# Patient Record
Sex: FEMALE | Race: WHITE | Employment: UNEMPLOYED | URBAN - METROPOLITAN AREA
[De-identification: names, ages, dates, MRNs, and addresses within clinical notes are randomized per-mention and may not be internally consistent; named-entity substitution may affect disease eponyms.]

---

## 2017-06-22 ENCOUNTER — TELEPHONE (OUTPATIENT)
Dept: INTERNAL MEDICINE CLINIC | Age: 57
End: 2017-06-22

## 2017-06-22 DIAGNOSIS — F40.243 FEAR OF FLYING: Primary | ICD-10-CM

## 2017-06-22 NOTE — TELEPHONE ENCOUNTER
Patient is leaving next Weds, the 28th on her trip.   Asked if Dr. Emmer Canavan would send in a rx for Lorazepam 0.5mg, #30 to [facility]

## 2017-06-23 ENCOUNTER — TELEPHONE (OUTPATIENT)
Dept: INTERNAL MEDICINE CLINIC | Age: 57
End: 2017-06-23

## 2017-06-23 RX ORDER — ALPRAZOLAM 0.5 MG/1
TABLET ORAL
Qty: 10 TAB | Refills: 0 | OUTPATIENT
Start: 2017-06-23 | End: 2017-10-24

## 2017-06-23 NOTE — TELEPHONE ENCOUNTER
Called pt and informed them that Lorazepam is not on pt medication list. Pt stated that she did discuss with Dr Selma Brito before that she had tried this medication before and that she is traveling to [facility] next week and would like to have it on hand if she needed it. She stated why she is asking for 30 pills is because of cost because to get 2 would be just as much as to get 30.

## 2017-06-23 NOTE — TELEPHONE ENCOUNTER
Is this is the right patient? She is not on lorazepam.  I'm not sure about what trip she is talking about. I'm not sure why she needs 30 days of ativan.

## 2017-06-25 RX ORDER — ESTRADIOL 10 UG/1
10 INSERT VAGINAL
Qty: 24 TAB | Refills: 1 | Status: SHIPPED | OUTPATIENT
Start: 2017-06-26 | End: 2017-12-02 | Stop reason: SDUPTHER

## 2017-09-21 ENCOUNTER — HOSPITAL ENCOUNTER (OUTPATIENT)
Dept: MAMMOGRAPHY | Age: 57
Discharge: HOME | End: 2017-09-21
Payer: COMMERCIAL

## 2017-09-21 DIAGNOSIS — Z12.31 VISIT FOR SCREENING MAMMOGRAM: ICD-10-CM

## 2017-09-21 PROCEDURE — 77067 SCR MAMMO BI INCL CAD: CPT

## 2017-10-24 ENCOUNTER — OFFICE VISIT (OUTPATIENT)
Dept: INTERNAL MEDICINE CLINIC | Age: 57
End: 2017-10-24

## 2017-10-24 VITALS
SYSTOLIC BLOOD PRESSURE: 124 MMHG | WEIGHT: 141 LBS | DIASTOLIC BLOOD PRESSURE: 76 MMHG | HEART RATE: 88 BPM | RESPIRATION RATE: 16 BRPM | BODY MASS INDEX: 23.49 KG/M2 | TEMPERATURE: 98.9 F | OXYGEN SATURATION: 99 % | HEIGHT: 65 IN

## 2017-10-24 DIAGNOSIS — J40 BRONCHITIS: Primary | ICD-10-CM

## 2017-10-24 DIAGNOSIS — Z00.00 ROUTINE PHYSICAL EXAMINATION: Primary | ICD-10-CM

## 2017-10-24 RX ORDER — CODEINE PHOSPHATE AND GUAIFENESIN 10; 100 MG/5ML; MG/5ML
5 SOLUTION ORAL
Qty: 118 ML | Refills: 0 | Status: SHIPPED | OUTPATIENT
Start: 2017-10-24 | End: 2017-10-30

## 2017-10-24 RX ORDER — ESTRADIOL 10 UG/1
10 INSERT VAGINAL
Qty: 24 TAB | Refills: 1 | Status: CANCELLED | OUTPATIENT
Start: 2017-10-26

## 2017-10-24 RX ORDER — DOXYCYCLINE 100 MG/1
100 CAPSULE ORAL 2 TIMES DAILY
Qty: 14 CAP | Refills: 0 | Status: SHIPPED | OUTPATIENT
Start: 2017-10-24 | End: 2017-10-31

## 2017-10-24 NOTE — PATIENT INSTRUCTIONS
Bronchitis: Care Instructions  Your Care Instructions    Bronchitis is inflammation of the bronchial tubes, which carry air to the lungs. The tubes swell and produce mucus, or phlegm. The mucus and inflamed bronchial tubes make you cough. You may have trouble breathing. Most cases of bronchitis are caused by viruses like those that cause colds. Antibiotics usually do not help and they may be harmful. Bronchitis usually develops rapidly and lasts about 2 to 3 weeks in otherwise healthy people. Follow-up care is a key part of your treatment and safety. Be sure to make and go to all appointments, and call your doctor if you are having problems. It's also a good idea to know your test results and keep a list of the medicines you take. How can you care for yourself at home? · Take all medicines exactly as prescribed. Call your doctor if you think you are having a problem with your medicine. · Get some extra rest.  · Take an over-the-counter pain medicine, such as acetaminophen (Tylenol), ibuprofen (Advil, Motrin), or naproxen (Aleve) to reduce fever and relieve body aches. Read and follow all instructions on the label. · Do not take two or more pain medicines at the same time unless the doctor told you to. Many pain medicines have acetaminophen, which is Tylenol. Too much acetaminophen (Tylenol) can be harmful. · Take an over-the-counter cough medicine that contains dextromethorphan to help quiet a dry, hacking cough so that you can sleep. Avoid cough medicines that have more than one active ingredient. Read and follow all instructions on the label. · Breathe moist air from a humidifier, hot shower, or sink filled with hot water. The heat and moisture will thin mucus so you can cough it out. · Do not smoke. Smoking can make bronchitis worse. If you need help quitting, talk to your doctor about stop-smoking programs and medicines. These can increase your chances of quitting for good.   When should you call for help? Call 911 anytime you think you may need emergency care. For example, call if:  · You have severe trouble breathing. Call your doctor now or seek immediate medical care if:  · You have new or worse trouble breathing. · You cough up dark brown or bloody mucus (sputum). · You have a new or higher fever. · You have a new rash. Watch closely for changes in your health, and be sure to contact your doctor if:  · You cough more deeply or more often, especially if you notice more mucus or a change in the color of your mucus. · You are not getting better as expected. Where can you learn more? Go to http://adria-arline.info/. Enter H333 in the search box to learn more about \"Bronchitis: Care Instructions. \"  Current as of: March 25, 2017  Content Version: 11.3  © 6957-5114 [employer]. Care instructions adapted under license by [employer] (which disclaims liability or warranty for this information). If you have questions about a medical condition or this instruction, always ask your healthcare professional. [address] any warranty or liability for your use of this information.

## 2017-10-24 NOTE — PROGRESS NOTES
Theo Cuevas is a 61 y.o. female who was seen in clinic today (10/24/2017). Assessment & Plan:  Diagnoses and all orders for this visit:    1. Bronchitis- discussed diagnosis & treatment options, favor this is bacterial at this time. Did review it could still be viral. Will start meds below, red flags were reviewed with the patient to RTC or notify me. Expected time course for resolution reviewed with patient.   -     guaiFENesin-codeine (ROBITUSSIN AC) 100-10 mg/5 mL solution; Take 5 mL by mouth three (3) times daily as needed for Cough. Max Daily Amount: 15 mL. -     doxycycline (VIBRAMYCIN) 100 mg capsule; Take 1 Cap by mouth two (2) times a day for 7 days. Follow-up Disposition:  Return if symptoms worsen or fail to improve.       ----------------------------------------------------------------------    Subjective:  Kym was seen today for Cough    URI Review  Kiana Woody returns to clinic today to talk about: URI symptoms for 7 days ago, which are fluctuating. She also reports sore throat, productive cough, right ear pain/fullness, hot and cold spells, chest pain (burning from throat to chest) and sinus congestion. Treatments have included: Robitussin & OTC cough meds which have been not very effective. Relevant PMH: No pertinent additional PMH. Patient reports sick contacts: yes - has been around someone w/ bronchitis. Prior to Admission medications    Medication Sig Start Date End Date Taking? Authorizing Provider   estradiol (VAGIFEM) 10 mcg tab vaginal tablet Insert 1 Tab into vagina every Monday and Thursday. 6/26/17  Yes Julianne Melgar MD   cholecalciferol, vitamin D3, (VITAMIN D3) 2,000 unit tab Take 1 Tab by mouth daily. Yes Historical Provider   INULIN (FIBER GUMMIES PO) Take 2 Each by mouth nightly. Yes Historical Provider   melatonin 5 mg tab Take 5 mg by mouth nightly as needed.     Historical Provider          Allergies   Allergen Reactions  Adhesive Tape-Silicones Other (comments)     bruising    Floxin [Ofloxacin] Other (comments)     Altered mental status.  Red Dye Nausea Only     Shaking, severe headache.  Sulfa (Sulfonamide Antibiotics) Other (comments)     Many relatives are allergic to sulfa - causes facial swelling, hives and lumps all over body (systemic reaction). ROS - per HPI       Objective:   Physical Exam   Constitutional: No distress. HENT:   Right Ear: Tympanic membrane is injected. Tympanic membrane is not erythematous and not bulging. A middle ear effusion (moderate) is present. Left Ear: Tympanic membrane is not erythematous and not bulging. No middle ear effusion. Nose: No mucosal edema or rhinorrhea. Right sinus exhibits no maxillary sinus tenderness and no frontal sinus tenderness. Left sinus exhibits no maxillary sinus tenderness and no frontal sinus tenderness. Mouth/Throat: Uvula is midline and mucous membranes are normal. Posterior oropharyngeal erythema present. No oropharyngeal exudate. Eyes: Conjunctivae are normal. No scleral icterus. Neck: Neck supple. Cardiovascular: Regular rhythm and normal heart sounds. No murmur heard. Pulmonary/Chest: Effort normal and breath sounds normal. She has no wheezes. She has no rales. Lymphadenopathy:     She has no cervical adenopathy. Visit Vitals    /76    Pulse 88    Temp 98.9 °F (37.2 °C) (Oral)    Resp 16    Ht 5' 4.5\" (1.638 m)    Wt 141 lb (64 kg)    SpO2 99%    BMI 23.83 kg/m2         Disclaimer:  Advised her to call back or return to office if symptoms worsen/change/persist.  Discussed expected course/resolution/complications of diagnosis in detail with patient. Medication risks/benefits/costs/interactions/alternatives discussed with patient. She was given an after visit summary which includes diagnoses, current medications, & vitals. She expressed understanding with the diagnosis and plan.         Amberly Sommer Jeet Ballard MD

## 2017-10-24 NOTE — MR AVS SNAPSHOT
Visit Information Date & Time Provider Department Dept. Phone Encounter #  
 10/24/2017  4:00 PM Sandra May, [address] [facility] Internal Medicine [phone] [id number] Follow-up Instructions Return if symptoms worsen or fail to improve. Your Appointments 10/30/2017  8:30 AM  
WELL WOMAN EXAM with Sandra May MD  
[facility] Internal Medicine [facility]) Appt Note: [address] [facility] [address] [facility] [address]  
[address] [address] [facility] Upcoming Health Maintenance Date Due Pneumococcal 19-64 Highest Risk (1 of 3 - PCV13) 8/19/1978 COLONOSCOPY 3/27/2018 PAP AKA CERVICAL CYTOLOGY 9/29/2018 BREAST CANCER SCRN MAMMOGRAM 9/21/2019 DTaP/Tdap/Td series (2 - Td) 9/29/2025 Allergies as of 10/24/2017  Review Complete On: 10/24/2017 By: Sandra May MD  
  
 Severity Noted Reaction Type Reactions Adhesive Tape-silicones  86/00/9330    Other (comments) bruising Floxin [Ofloxacin]  02/03/2012    Other (comments) Altered mental status. Red Dye  02/03/2012    Nausea Only Shaking, severe headache. Sulfa (Sulfonamide Antibiotics)  12/26/2012    Other (comments) Many relatives are allergic to sulfa - causes facial swelling, hives and lumps all over body (systemic reaction). Current Immunizations  Reviewed on 10/24/2017 Name Date Tdap 9/29/2015 Reviewed by Jeanne Galan RN on 10/24/2017 at  4:09 PM  
You Were Diagnosed With   
  
 Codes Comments Bronchitis    -  Primary ICD-10-CM: N57 ICD-9-CM: 802 Vitals BP Pulse Temp Resp Height(growth percentile) Weight(growth percentile) 124/76 88 98.9 °F (37.2 °C) (Oral) 16 5' 4.5\" (1.638 m) 141 lb (64 kg) SpO2 BMI OB Status Smoking Status 99% 23.83 kg/m2 Postmenopausal Never Smoker Vitals History BMI and BSA Data Body Mass Index Body Surface Area 23.83 kg/m 2 1.71 m 2 Preferred Pharmacy Pharmacy Name Phone [facility][id number]- Makenna Mcdaniels, [address] [address] [phone] Your Updated Medication List  
  
   
This list is accurate as of: 10/24/17  4:35 PM.  Always use your most recent med list.  
  
  
  
  
 doxycycline 100 mg capsule Commonly known as:  VIBRAMYCIN Take 1 Cap by mouth two (2) times a day for 7 days. estradiol 10 mcg Tab vaginal tablet Commonly known as:  Donna Marcial Insert 1 Tab into vagina every Monday and Thursday. FIBER GUMMIES PO Take 2 Each by mouth nightly. guaiFENesin-codeine 100-10 mg/5 mL solution Commonly known as:  ROBITUSSIN AC Take 5 mL by mouth three (3) times daily as needed for Cough. Max Daily Amount: 15 mL.  
  
 melatonin Tab tablet Take 5 mg by mouth nightly as needed. VITAMIN D3 2,000 unit Tab Generic drug:  cholecalciferol (vitamin D3) Take 1 Tab by mouth daily. Prescriptions Printed Refills  
 guaiFENesin-codeine (ROBITUSSIN AC) 100-10 mg/5 mL solution 0 Sig: Take 5 mL by mouth three (3) times daily as needed for Cough. Max Daily Amount: 15 mL. Class: Print Route: Oral  
  
Prescriptions Sent to Pharmacy Refills  
 doxycycline (VIBRAMYCIN) 100 mg capsule 0 Sig: Take 1 Cap by mouth two (2) times a day for 7 days. Class: Normal  
 Pharmacy: [facility]/pharmacy #[id number] Mcdaniels, [address] #: [phone] Route: Oral  
  
Follow-up Instructions Return if symptoms worsen or fail to improve. Patient Instructions Bronchitis: Care Instructions Your Care Instructions Bronchitis is inflammation of the bronchial tubes, which carry air to the lungs. The tubes swell and produce mucus, or phlegm. The mucus and inflamed bronchial tubes make you cough. You may have trouble breathing. Most cases of bronchitis are caused by viruses like those that cause colds. Antibiotics usually do not help and they may be harmful. Bronchitis usually develops rapidly and lasts about 2 to 3 weeks in otherwise healthy people. Follow-up care is a key part of your treatment and safety. Be sure to make and go to all appointments, and call your doctor if you are having problems. It's also a good idea to know your test results and keep a list of the medicines you take. How can you care for yourself at home? · Take all medicines exactly as prescribed. Call your doctor if you think you are having a problem with your medicine. · Get some extra rest. 
· Take an over-the-counter pain medicine, such as acetaminophen (Tylenol), ibuprofen (Advil, Motrin), or naproxen (Aleve) to reduce fever and relieve body aches. Read and follow all instructions on the label. · Do not take two or more pain medicines at the same time unless the doctor told you to. Many pain medicines have acetaminophen, which is Tylenol. Too much acetaminophen (Tylenol) can be harmful. · Take an over-the-counter cough medicine that contains dextromethorphan to help quiet a dry, hacking cough so that you can sleep. Avoid cough medicines that have more than one active ingredient. Read and follow all instructions on the label. · Breathe moist air from a humidifier, hot shower, or sink filled with hot water. The heat and moisture will thin mucus so you can cough it out. · Do not smoke. Smoking can make bronchitis worse. If you need help quitting, talk to your doctor about stop-smoking programs and medicines. These can increase your chances of quitting for good. When should you call for help? Call 911 anytime you think you may need emergency care. For example, call if: 
· You have severe trouble breathing. Call your doctor now or seek immediate medical care if: 
· You have new or worse trouble breathing. · You cough up dark brown or bloody mucus (sputum). · You have a new or higher fever. · You have a new rash. Watch closely for changes in your health, and be sure to contact your doctor if: 
· You cough more deeply or more often, especially if you notice more mucus or a change in the color of your mucus. · You are not getting better as expected. Where can you learn more? Go to http://adria-arline.info/. Enter H333 in the search box to learn more about \"Bronchitis: Care Instructions. \" Current as of: March 25, 2017 Content Version: 11.3 © 4129-4124 [employer]. Care instructions adapted under license by [employer] (which disclaims liability or warranty for this information). If you have questions about a medical condition or this instruction, always ask your healthcare professional. [address] any warranty or liability for your use of this information. Introducing [facility]! Ni Harvey introduces SceneDoc patient portal. Now you can access parts of your medical record, email your doctor's office, and request medication refills online. 1. In your internet browser, go to [email or web address] 2. Click on the First Time User? Click Here link in the Sign In box. You will see the New Member Sign Up page. 3. Enter your SceneDoc Access Code exactly as it appears below. You will not need to use this code after youve completed the sign-up process. If you do not sign up before the expiration date, you must request a new code. · SceneDoc Access Code: [email or web address] Expires: 12/17/2017 11:37 AM 
 
4. Enter the last four digits of your Social Security Number (xxxx) and Date of Birth (mm/dd/yyyy) as indicated and click Submit. You will be taken to the next sign-up page. 5. Create a SceneDoc ID. This will be your SceneDoc login ID and cannot be changed, so think of one that is secure and easy to remember. 6. Create a [employer] password. You can change your password at any time. 7. Enter your Password Reset Question and Answer. This can be used at a later time if you forget your password. 8. Enter your e-mail address. You will receive e-mail notification when new information is available in [address] 9. Click Sign Up. You can now view and download portions of your medical record. 10. Click the Download Summary menu link to download a portable copy of your medical information. If you have questions, please visit the Frequently Asked Questions section of the [employer] website. Remember, [employer] is NOT to be used for urgent needs. For medical emergencies, dial 911. Now available from your iPhone and Android! Please provide this summary of care documentation to your next provider. Your primary care clinician is listed as Joanie Cuevas. If you have any questions after today's visit, please call [phone].

## 2017-10-27 LAB
ALBUMIN SERPL-MCNC: 4.8 G/DL (ref 3.5–5.5)
ALBUMIN/GLOB SERPL: 1.7 {RATIO} (ref 1.2–2.2)
ALP SERPL-CCNC: 91 IU/L (ref 39–117)
ALT SERPL-CCNC: 12 IU/L (ref 0–32)
AST SERPL-CCNC: 13 IU/L (ref 0–40)
BILIRUB SERPL-MCNC: 0.5 MG/DL (ref 0–1.2)
BUN SERPL-MCNC: 13 MG/DL (ref 6–24)
BUN/CREAT SERPL: 16 (ref 9–23)
CALCIUM SERPL-MCNC: 9.7 MG/DL (ref 8.7–10.2)
CEA SERPL-MCNC: 1.5 NG/ML (ref 0–4.7)
CHLORIDE SERPL-SCNC: 100 MMOL/L (ref 96–106)
CHOLEST SERPL-MCNC: 277 MG/DL (ref 100–199)
CO2 SERPL-SCNC: 21 MMOL/L (ref 18–29)
CREAT SERPL-MCNC: 0.79 MG/DL (ref 0.57–1)
ERYTHROCYTE [DISTWIDTH] IN BLOOD BY AUTOMATED COUNT: 13.9 % (ref 12.3–15.4)
GFR SERPLBLD CREATININE-BSD FMLA CKD-EPI: 83 ML/MIN/1.73
GFR SERPLBLD CREATININE-BSD FMLA CKD-EPI: 95 ML/MIN/1.73
GLOBULIN SER CALC-MCNC: 2.8 G/DL (ref 1.5–4.5)
GLUCOSE SERPL-MCNC: 97 MG/DL (ref 65–99)
HCT VFR BLD AUTO: 42.1 % (ref 34–46.6)
HDLC SERPL-MCNC: 100 MG/DL
HGB BLD-MCNC: 14.2 G/DL (ref 11.1–15.9)
LDLC SERPL CALC-MCNC: 163 MG/DL (ref 0–99)
MCH RBC QN AUTO: 29.6 PG (ref 26.6–33)
MCHC RBC AUTO-ENTMCNC: 33.7 G/DL (ref 31.5–35.7)
MCV RBC AUTO: 88 FL (ref 79–97)
PLATELET # BLD AUTO: 226 X10E3/UL (ref 150–379)
POTASSIUM SERPL-SCNC: 4.2 MMOL/L (ref 3.5–5.2)
PROT SERPL-MCNC: 7.6 G/DL (ref 6–8.5)
RBC # BLD AUTO: 4.79 X10E6/UL (ref 3.77–5.28)
SODIUM SERPL-SCNC: 141 MMOL/L (ref 134–144)
TRIGL SERPL-MCNC: 70 MG/DL (ref 0–149)
VLDLC SERPL CALC-MCNC: 14 MG/DL (ref 5–40)
WBC # BLD AUTO: 5.2 X10E3/UL (ref 3.4–10.8)

## 2017-10-27 NOTE — PROGRESS NOTES
She has appointment on 10/30 to review. All labs are stable or at goal.  10 yr CVD risk is 2.1%. CEA is stable.

## 2017-10-30 ENCOUNTER — OFFICE VISIT (OUTPATIENT)
Dept: INTERNAL MEDICINE CLINIC | Age: 57
End: 2017-10-30

## 2017-10-30 VITALS
HEART RATE: 65 BPM | DIASTOLIC BLOOD PRESSURE: 78 MMHG | TEMPERATURE: 98.2 F | SYSTOLIC BLOOD PRESSURE: 132 MMHG | HEIGHT: 65 IN | WEIGHT: 139 LBS | OXYGEN SATURATION: 99 % | RESPIRATION RATE: 12 BRPM | BODY MASS INDEX: 23.16 KG/M2

## 2017-10-30 DIAGNOSIS — N95.1 VAGINAL DRYNESS, MENOPAUSAL: ICD-10-CM

## 2017-10-30 DIAGNOSIS — Z00.00 ROUTINE PHYSICAL EXAMINATION: Primary | ICD-10-CM

## 2017-10-30 DIAGNOSIS — M85.89 OSTEOPENIA OF MULTIPLE SITES: ICD-10-CM

## 2017-10-30 DIAGNOSIS — C18.9 MALIGNANT NEOPLASM OF COLON, UNSPECIFIED PART OF COLON (HCC): ICD-10-CM

## 2017-10-30 DIAGNOSIS — F41.9 ANXIETY: ICD-10-CM

## 2017-10-30 DIAGNOSIS — L40.9 PSORIASIS: ICD-10-CM

## 2017-10-30 NOTE — PROGRESS NOTES
Ida Beckford is a 61 y.o. female who was seen in clinic today (10/30/2017) for a full physical.      Assessment & Plan:   Diagnoses and all orders for this visit:    1. Routine physical examination  -     Previous labs reviewed & discussed with her, lab letter provided today. 2. Malignant neoplasm of colon, unspecified part of colon ([facility] [address]- stable CEA, due for c-scope, she will contact Dr Sherryle Chamber to set up procedure    3. Anxiety- stable off meds, cont lifestyle changes and will notice me if anything changes. 4. Osteopenia of multiple sites- stable, continue current treatment, will plan on repeating DEXA in '20    5. Vaginal dryness, menopausal- improved with meds, continue at this time    6. Psoriasis- chronic issue, she will make appointment with specialist to review, looks well controlled now. Follow-up Disposition:  Return in about 1 year (around 10/30/2018) for FULL PHYSICAL - 30 minutes. ------------------------------------------------------------------------------------------    Subjective:   Ida Watson is here today for a full physical.      Health Maintenance  Immunizations:    Influenza: declined. Tetanus: up to date. Shingles: not up to date - will defer to next year and plan on getting Shingrix. Pneumonia: n/a. Cancer screening:    Cervical: reviewed guidelines, UTD. Breast: reviewed guidelines, reviewed SBE with her, UTD. Colon: she will f/u with colorectal surgeon. Patient Care Team:  Chong Rossi MD as PCP - General (Internal Medicine)  Fya Gonzalez MD (Dermatology)      The following sections were reviewed & updated as appropriate: PMH, PSH, FH, and SH. Patient Active Problem List   Diagnosis Code    Colon cancer ([facility] [address] C18.9    Anemia due to blood loss, chronic D50.0    Psoriasis L40.9    Multiple fractures T07. XXXA    Osteopenia M85.80    Anxiety F41.9    Vaginal dryness, menopausal N95.1    Closed nondisplaced fracture of phalanx of lesser toe of right foot S92.504A          Prior to Admission medications    Medication Sig Start Date End Date Taking? Authorizing Provider   doxycycline (VIBRAMYCIN) 100 mg capsule Take 1 Cap by mouth two (2) times a day for 7 days. 10/24/17 10/31/17 Yes Tomas Thomas MD   estradiol (VAGIFEM) 10 mcg tab vaginal tablet Insert 1 Tab into vagina every Monday and Thursday. 6/26/17  Yes Tomas Thomas MD   cholecalciferol, vitamin D3, (VITAMIN D3) 2,000 unit tab Take 1 Tab by mouth daily. Yes Historical Provider   INULIN (FIBER GUMMIES PO) Take 2 Each by mouth nightly. Yes Historical Provider          Allergies   Allergen Reactions    Adhesive Tape-Silicones Other (comments)     bruising    Floxin [Ofloxacin] Other (comments)     Altered mental status.  Red Dye Nausea Only     Shaking, severe headache.  Sulfa (Sulfonamide Antibiotics) Other (comments)     Many relatives are allergic to sulfa - causes facial swelling, hives and lumps all over body (systemic reaction). Review of Systems   Constitutional: Negative for chills and fever. Respiratory: Negative for cough and shortness of breath. Cardiovascular: Negative for chest pain and palpitations. Gastrointestinal: Negative for abdominal pain, blood in stool, constipation, diarrhea, heartburn, nausea and vomiting. Musculoskeletal: Negative for joint pain and myalgias. Skin: Positive for rash (she reports h/o psoriasis, on/off, seen derm on/off, slightly worse, has appt w/ Dr Juvenal Kawasaki in a month). Neurological: Negative for tingling, focal weakness and headaches. Endo/Heme/Allergies: Does not bruise/bleed easily. Psychiatric/Behavioral: Negative for depression. The patient is not nervous/anxious and does not have insomnia. Objective:   Physical Exam   Constitutional: She appears well-developed. No distress.    HENT:   Right Ear: Tympanic membrane, external ear and ear canal normal.   Left Ear: Tympanic membrane, external ear and ear canal normal.   Nose: Nose normal.   Mouth/Throat: Uvula is midline, oropharynx is clear and moist and mucous membranes are normal. No posterior oropharyngeal erythema. Eyes: Conjunctivae and lids are normal. No scleral icterus. Neck: Neck supple. Carotid bruit is not present. No thyromegaly present. Cardiovascular: Regular rhythm and normal heart sounds. No murmur heard. Pulses:       Dorsalis pedis pulses are 2+ on the right side, and 2+ on the left side. Posterior tibial pulses are 2+ on the right side, and 2+ on the left side. Pulmonary/Chest: Effort normal and breath sounds normal. She has no wheezes. She has no rales. Abdominal: Soft. Bowel sounds are normal. She exhibits no mass. There is no hepatosplenomegaly. There is no tenderness. Musculoskeletal: Normal range of motion. She exhibits no edema. Cervical back: Normal.        Thoracic back: She exhibits no bony tenderness. Lumbar back: Normal.   Lymphadenopathy:     She has no cervical adenopathy. Neurological: She has normal strength. No sensory deficit. Skin: No rash noted. Psychiatric: She has a normal mood and affect. Her behavior is normal.          Visit Vitals    /78    Pulse 65    Temp 98.2 °F (36.8 °C) (Oral)    Resp 12    Ht 5' 4.8\" (1.646 m)    Wt 139 lb (63 kg)    SpO2 99%    BMI 23.27 kg/m2          Advised her to call back or return to office if symptoms worsen/change/persist.  Discussed expected course/resolution/complications of diagnosis in detail with patient. Medication risks/benefits/costs/interactions/alternatives discussed with patient. She was given an after visit summary which includes diagnoses, current medications, & vitals. She expressed understanding with the diagnosis and plan.         Skipper Hammans, MD

## 2017-10-30 NOTE — PATIENT INSTRUCTIONS
Well Visit, Women 48 to 72: Care Instructions  Your Care Instructions    Physical exams can help you stay healthy. Your doctor has checked your overall health and may have suggested ways to take good care of yourself. He or she also may have recommended tests. At home, you can help prevent illness with healthy eating, regular exercise, and other steps. Follow-up care is a key part of your treatment and safety. Be sure to make and go to all appointments, and call your doctor if you are having problems. It's also a good idea to know your test results and keep a list of the medicines you take. How can you care for yourself at home? · Reach and stay at a healthy weight. This will lower your risk for many problems, such as obesity, diabetes, heart disease, and high blood pressure. · Get at least 30 minutes of exercise on most days of the week. Walking is a good choice. You also may want to do other activities, such as running, swimming, cycling, or playing tennis or team sports. · Do not smoke. Smoking can make health problems worse. If you need help quitting, talk to your doctor about stop-smoking programs and medicines. These can increase your chances of quitting for good. · Protect your skin from too much sun. When you're outdoors from 10 a.m. to 4 p.m., stay in the shade or cover up with clothing and a hat with a wide brim. Wear sunglasses that block UV rays. Even when it's cloudy, put broad-spectrum sunscreen (SPF 30 or higher) on any exposed skin. · See a dentist one or two times a year for checkups and to have your teeth cleaned. · Wear a seat belt in the car. · Limit alcohol to 1 drink a day. Too much alcohol can cause health problems. Follow your doctor's advice about when to have certain tests. These tests can spot problems early. · Cholesterol.  Your doctor will tell you how often to have this done based on your age, family history, or other things that can increase your risk for heart attack and stroke. · Blood pressure. Have your blood pressure checked during a routine doctor visit. Your doctor will tell you how often to check your blood pressure based on your age, your blood pressure results, and other factors. · Mammogram. Ask your doctor how often you should have a mammogram, which is an X-ray of your breasts. A mammogram can spot breast cancer before it can be felt and when it is easiest to treat. · Pap test and pelvic exam. Ask your doctor how often you should have a Pap test. You may not need to have a Pap test as often as you used to. · Vision. Have your eyes checked every year or two or as often as your doctor suggests. Some experts recommend that you have yearly exams for glaucoma and other age-related eye problems starting at age 48. · Hearing. Tell your doctor if you notice any change in your hearing. You can have tests to find out how well you hear. · Diabetes. Ask your doctor whether you should have tests for diabetes. · Colon cancer. You should begin tests for colon cancer at age 48. You may have one of several tests. Your doctor will tell you how often to have tests based on your age and risk. Risks include whether you already had a precancerous polyp removed from your colon or whether your parents, sisters and brothers, or children have had colon cancer. · Thyroid disease. Talk to your doctor about whether to have your thyroid checked as part of a regular physical exam. Women have an increased chance of a thyroid problem. · Osteoporosis. You should begin tests for bone density at age 72. If you are younger than 72, ask your doctor whether you have factors that may increase your risk for this disease. You may want to have this test before age 72. · Heart attack and stroke risk. At least every 4 to 6 years, you should have your risk for heart attack and stroke assessed.  Your doctor uses factors such as your age, blood pressure, cholesterol, and whether you smoke or have diabetes to show what your risk for a heart attack or stroke is over the next 10 years. When should you call for help? Watch closely for changes in your health, and be sure to contact your doctor if you have any problems or symptoms that concern you. Where can you learn more? Go to http://adria-arline.info/. Enter R893 in the search box to learn more about \"Well Visit, Women 50 to 72: Care Instructions. \"  Current as of: May 12, 2017  Content Version: 11.4  © 9072-8717 [employer]. Care instructions adapted under license by [employer] (which disclaims liability or warranty for this information). If you have questions about a medical condition or this instruction, always ask your healthcare professional. [address] any warranty or liability for your use of this information. Preventing Falls: Care Instructions  Your Care Instructions    Getting around your home safely can be a challenge if you have injuries or health problems that make it easy for you to fall. Loose rugs and furniture in walkways are among the dangers for many older people who have problems walking or who have poor eyesight. People who have conditions such as arthritis, osteoporosis, or dementia also have to be careful not to fall. You can make your home safer with a few simple measures. Follow-up care is a key part of your treatment and safety. Be sure to make and go to all appointments, and call your doctor if you are having problems. It's also a good idea to know your test results and keep a list of the medicines you take. How can you care for yourself at home? Taking care of yourself  · You may get dizzy if you do not drink enough water. To prevent dehydration, drink plenty of fluids, enough so that your urine is light yellow or clear like water. Choose water and other caffeine-free clear liquids.  If you have kidney, heart, or liver disease and have to limit fluids, talk with your doctor before you increase the amount of fluids you drink. · Exercise regularly to improve your strength, muscle tone, and balance. Walk if you can. Swimming may be a good choice if you cannot walk easily. · Have your vision and hearing checked each year or any time you notice a change. If you have trouble seeing and hearing, you might not be able to avoid objects and could lose your balance. · Know the side effects of the medicines you take. Ask your doctor or pharmacist whether the medicines you take can affect your balance. Sleeping pills or sedatives can affect your balance. · Limit the amount of alcohol you drink. Alcohol can impair your balance and other senses. · Ask your doctor whether calluses or corns on your feet need to be removed. If you wear loose-fitting shoes because of calluses or corns, you can lose your balance and fall. · Talk to your doctor if you have numbness in your feet. Preventing falls at home  · Remove raised doorway thresholds, throw rugs, and clutter. Repair loose carpet or raised areas in the floor. · Move furniture and electrical cords to keep them out of walking paths. · Use nonskid floor wax, and wipe up spills right away, especially on ceramic tile floors. · If you use a walker or cane, put rubber tips on it. If you use crutches, clean the bottoms of them regularly with an abrasive pad, such as steel wool. · Keep your house well lit, especially [address], and outside walkways. Use night-lights in areas such as hallways and bathrooms. Add extra light switches or use remote switches (such as switches that go on or off when you clap your hands) to make it easier to turn lights on if you have to get up during the night. · Install sturdy handrails on stairways. · Move items in your cabinets so that the things you use a lot are on the lower shelves (about waist level). · Keep a cordless phone and a flashlight with new batteries by your bed.  If possible, put a phone in each of the main rooms of your house, or carry a cell phone in case you fall and cannot reach a phone. Or, you can wear a device around your neck or wrist. You push a button that sends a signal for help. · Wear low-heeled shoes that fit well and give your feet good support. Use footwear with nonskid soles. Check the heels and soles of your shoes for wear. Repair or replace worn heels or soles. · Do not wear socks without shoes on wood floors. · Walk on the grass when the sidewalks are slippery. If you live in an area that gets snow and ice in the winter, sprinkle salt on slippery steps and sidewalks. Preventing falls in the bath  · Install grab bars and nonskid mats inside and outside your shower or tub and near the toilet and sinks. · Use shower chairs and bath benches. · Use a hand-held shower head that will allow you to sit while showering. · Get into a tub or shower by putting the weaker leg in first. Get out of a tub or shower with your strong side first.  · Repair loose toilet seats and consider installing a raised toilet seat to make getting on and off the toilet easier. · Keep your bathroom door unlocked while you are in the shower. Where can you learn more? Go to http://adria-arline.info/. Enter 0476 79 69 [id number] in the search box to learn more about \"Preventing Falls: Care Instructions. \"  Current as of: May 12, 2017  Content Version: 11.4  © 1017-9977 [employer]. Care instructions adapted under license by [employer] (which disclaims liability or warranty for this information). If you have questions about a medical condition or this instruction, always ask your healthcare professional. [address] any warranty or liability for your use of this information.

## 2017-10-30 NOTE — MR AVS SNAPSHOT
Visit Information Date & Time Provider Department Dept. Phone Encounter #  
 10/30/2017  8:30 AM Tal Charles, [address] [facility] Internal Medicine [phone] [id number] Follow-up Instructions Return in about 1 year (around 10/30/2018) for FULL PHYSICAL - 30 minutes. Upcoming Health Maintenance Date Due Pneumococcal 19-64 Highest Risk (1 of 3 - PCV13) 8/19/1978 COLONOSCOPY 3/27/2018 BREAST CANCER SCRN MAMMOGRAM 9/21/2019 PAP AKA CERVICAL CYTOLOGY 9/29/2020 DTaP/Tdap/Td series (2 - Td) 9/29/2025 Allergies as of 10/30/2017  Review Complete On: 10/30/2017 By: Jeanne Galan RN Severity Noted Reaction Type Reactions Adhesive Tape-silicones  73/48/0366    Other (comments) bruising Floxin [Ofloxacin]  02/03/2012    Other (comments) Altered mental status. Red Dye  02/03/2012    Nausea Only Shaking, severe headache. Sulfa (Sulfonamide Antibiotics)  12/26/2012    Other (comments) Many relatives are allergic to sulfa - causes facial swelling, hives and lumps all over body (systemic reaction). Current Immunizations  Reviewed on 10/30/2017 Name Date Tdap 9/29/2015 Reviewed by Jeanne Galan RN on 10/30/2017 at  8:39 AM  
You Were Diagnosed With   
  
 Codes Comments Routine physical examination    -  Primary ICD-10-CM: Z00.00 ICD-9-CM: V70.0 Malignant neoplasm of colon, unspecified part of colon ([facility] [address]     ICD-10-CM: C18.9 ICD-9-CM: 153.9 Anxiety     ICD-10-CM: F41.9 ICD-9-CM: 300.00 Osteopenia of multiple sites     ICD-10-CM: M85.89 ICD-9-CM: 733.90 Vaginal dryness, menopausal     ICD-10-CM: N95.1 ICD-9-CM: 627.2 Vitals BP Pulse Temp Resp Height(growth percentile) Weight(growth percentile) 132/78 65 98.2 °F (36.8 °C) (Oral) 12 5' 4.8\" (1.646 m) 139 lb (63 kg) SpO2 BMI OB Status Smoking Status 99% 23.27 kg/m2 Postmenopausal Never Smoker Vitals History BMI and BSA Data Body Mass Index Body Surface Area  
 23.27 kg/m 2 1.7 m 2 Preferred Pharmacy Pharmacy Name Phone [facility] [address] [address] Your Updated Medication List  
  
   
This list is accurate as of: 10/30/17  9:12 AM.  Always use your most recent med list.  
  
  
  
  
 doxycycline 100 mg capsule Commonly known as:  VIBRAMYCIN Take 1 Cap by mouth two (2) times a day for 7 days. estradiol 10 mcg Tab vaginal tablet Commonly known as:  Vincent Babb Insert 1 Tab into vagina every Monday and Thursday. FIBER GUMMIES PO Take 2 Each by mouth nightly. VITAMIN D3 2,000 unit Tab Generic drug:  cholecalciferol (vitamin D3) Take 1 Tab by mouth daily. Follow-up Instructions Return in about 1 year (around 10/30/2018) for FULL PHYSICAL - 30 minutes. Patient Instructions Well Visit, Women 48 to 72: Care Instructions Your Care Instructions Physical exams can help you stay healthy. Your doctor has checked your overall health and may have suggested ways to take good care of yourself. He or she also may have recommended tests. At home, you can help prevent illness with healthy eating, regular exercise, and other steps. Follow-up care is a key part of your treatment and safety. Be sure to make and go to all appointments, and call your doctor if you are having problems. It's also a good idea to know your test results and keep a list of the medicines you take. How can you care for yourself at home? · Reach and stay at a healthy weight. This will lower your risk for many problems, such as obesity, diabetes, heart disease, and high blood pressure. · Get at least 30 minutes of exercise on most days of the week. Walking is a good choice. You also may want to do other activities, such as running, swimming, cycling, or playing tennis or team sports. · Do not smoke. Smoking can make health problems worse. If you need help quitting, talk to your doctor about stop-smoking programs and medicines. These can increase your chances of quitting for good. · Protect your skin from too much sun. When you're outdoors from 10 a.m. to 4 p.m., stay in the shade or cover up with clothing and a hat with a wide brim. Wear sunglasses that block UV rays. Even when it's cloudy, put broad-spectrum sunscreen (SPF 30 or higher) on any exposed skin. · See a dentist one or two times a year for checkups and to have your teeth cleaned. · Wear a seat belt in the car. · Limit alcohol to 1 drink a day. Too much alcohol can cause health problems. Follow your doctor's advice about when to have certain tests. These tests can spot problems early. · Cholesterol. Your doctor will tell you how often to have this done based on your age, family history, or other things that can increase your risk for heart attack and stroke. · Blood pressure. Have your blood pressure checked during a routine doctor visit. Your doctor will tell you how often to check your blood pressure based on your age, your blood pressure results, and other factors. · Mammogram. Ask your doctor how often you should have a mammogram, which is an X-ray of your breasts. A mammogram can spot breast cancer before it can be felt and when it is easiest to treat. · Pap test and pelvic exam. Ask your doctor how often you should have a Pap test. You may not need to have a Pap test as often as you used to. · Vision. Have your eyes checked every year or two or as often as your doctor suggests. Some experts recommend that you have yearly exams for glaucoma and other age-related eye problems starting at age 48. · Hearing. Tell your doctor if you notice any change in your hearing. You can have tests to find out how well you hear. · Diabetes. Ask your doctor whether you should have tests for diabetes. · Colon cancer. You should begin tests for colon cancer at age 48. You may have one of several tests. Your doctor will tell you how often to have tests based on your age and risk. Risks include whether you already had a precancerous polyp removed from your colon or whether your parents, sisters and brothers, or children have had colon cancer. · Thyroid disease. Talk to your doctor about whether to have your thyroid checked as part of a regular physical exam. Women have an increased chance of a thyroid problem. · Osteoporosis. You should begin tests for bone density at age 72. If you are younger than 72, ask your doctor whether you have factors that may increase your risk for this disease. You may want to have this test before age 72. · Heart attack and stroke risk. At least every 4 to 6 years, you should have your risk for heart attack and stroke assessed. Your doctor uses factors such as your age, blood pressure, cholesterol, and whether you smoke or have diabetes to show what your risk for a heart attack or stroke is over the next 10 years. When should you call for help? Watch closely for changes in your health, and be sure to contact your doctor if you have any problems or symptoms that concern you. Where can you learn more? Go to http://adria-arline.info/. Enter T960 in the search box to learn more about \"Well Visit, Women 50 to 72: Care Instructions. \" Current as of: May 12, 2017 Content Version: 11.4 © 2831-2431 [employer]. Care instructions adapted under license by [employer] (which disclaims liability or warranty for this information). If you have questions about a medical condition or this instruction, always ask your healthcare professional. [place] [address] any warranty or liability for your use of this information. Preventing Falls: Care Instructions Your Care Instructions Getting around your home safely can be a challenge if you have injuries or health problems that make it easy for you to fall. Loose rugs and furniture in walkways are among the dangers for many older people who have problems walking or who have poor eyesight. People who have conditions such as arthritis, osteoporosis, or dementia also have to be careful not to fall. You can make your home safer with a few simple measures. Follow-up care is a key part of your treatment and safety. Be sure to make and go to all appointments, and call your doctor if you are having problems. It's also a good idea to know your test results and keep a list of the medicines you take. How can you care for yourself at home? Taking care of yourself · You may get dizzy if you do not drink enough water. To prevent dehydration, drink plenty of fluids, enough so that your urine is light yellow or clear like water. Choose water and other caffeine-free clear liquids. If you have kidney, heart, or liver disease and have to limit fluids, talk with your doctor before you increase the amount of fluids you drink. · Exercise regularly to improve your strength, muscle tone, and balance. Walk if you can. Swimming may be a good choice if you cannot walk easily. · Have your vision and hearing checked each year or any time you notice a change. If you have trouble seeing and hearing, you might not be able to avoid objects and could lose your balance. · Know the side effects of the medicines you take. Ask your doctor or pharmacist whether the medicines you take can affect your balance. Sleeping pills or sedatives can affect your balance. · Limit the amount of alcohol you drink. Alcohol can impair your balance and other senses. · Ask your doctor whether calluses or corns on your feet need to be removed. If you wear loose-fitting shoes because of calluses or corns, you can lose your balance and fall. · Talk to your doctor if you have numbness in your feet. Preventing falls at home · Remove raised doorway thresholds, throw rugs, and clutter. Repair loose carpet or raised areas in the floor. · Move furniture and electrical cords to keep them out of walking paths. · Use nonskid floor wax, and wipe up spills right away, especially on ceramic tile floors. · If you use a walker or cane, put rubber tips on it. If you use crutches, clean the bottoms of them regularly with an abrasive pad, such as steel wool. · Keep your house well lit, especially [place], and outside walkways. Use night-lights in areas such as hallways and bathrooms. Add extra light switches or use remote switches (such as switches that go on or off when you clap your hands) to make it easier to turn lights on if you have to get up during the night. · Install sturdy handrails on stairways. · Move items in your cabinets so that the things you use a lot are on the lower shelves (about waist level). · Keep a cordless phone and a flashlight with new batteries by your bed. If possible, put a phone in each of the main rooms of your house, or carry a cell phone in case you fall and cannot reach a phone. Or, you can wear a device around your neck or wrist. You push a button that sends a signal for help. · Wear low-heeled shoes that fit well and give your feet good support. Use footwear with nonskid soles. Check the heels and soles of your shoes for wear. Repair or replace worn heels or soles. · Do not wear socks without shoes on wood floors. · Walk on the grass when the sidewalks are slippery. If you live in an area that gets snow and ice in the winter, sprinkle salt on slippery steps and sidewalks. Preventing falls in the bath · Install grab bars and nonskid mats inside and outside your shower or tub and near the toilet and sinks. · Use shower chairs and bath benches. · Use a hand-held shower head that will allow you to sit while showering. · Get into a tub or shower by putting the weaker leg in first. Get out of a tub or shower with your strong side first. 
· Repair loose toilet seats and consider installing a raised toilet seat to make getting on and off the toilet easier. · Keep your bathroom door unlocked while you are in the shower. Where can you learn more? Go to http://adria-arline.info/. Enter [id number] [id number] in the search box to learn more about \"Preventing Falls: Care Instructions. \" Current as of: May 12, 2017 Content Version: 11.4 © 3318-7671 [employer]. Care instructions adapted under license by [employer] (which disclaims liability or warranty for this information). If you have questions about a medical condition or this instruction, always ask your healthcare professional. [address] any warranty or liability for your use of this information. Introducing [facility]! Lisa Dalal introduces "[employer] patient portal. Now you can access parts of your medical record, email your doctor's office, and request medication refills online. 1. In your internet browser, go to [email or web address] 2. Click on the First Time User? Click Here link in the Sign In box. You will see the New Member Sign Up page. 3. Enter your "[employer] Access Code exactly as it appears below. You will not need to use this code after youve completed the sign-up process. If you do not sign up before the expiration date, you must request a new code. · "[employer] Access Code: [email or web address] Expires: 12/17/2017 11:37 AM 
 
4. Enter the last four digits of your Social Security Number (xxxx) and Date of Birth (mm/dd/yyyy) as indicated and click Submit. You will be taken to the next sign-up page. 5. Create a "[employer] ID.  This will be your "[employer] login ID and cannot be changed, so think of one that is secure and easy to remember. 6. Create a [employer]azz[employer] password. You can change your password at any time. 7. Enter your Password Reset Question and Answer. This can be used at a later time if you forget your password. 8. Enter your e-mail address. You will receive e-mail notification when new information is available in [address] 9. Click Sign Up. You can now view and download portions of your medical record. 10. Click the Download Summary menu link to download a portable copy of your medical information. If you have questions, please visit the Frequently Asked Questions section of the [employer] website. Remember, [employer] is NOT to be used for urgent needs. For medical emergencies, dial 911. Now available from your iPhone and Android! Please provide this summary of care documentation to your next provider. Your primary care clinician is listed as Seema Lantigua. If you have any questions after today's visit, please call [phone].

## 2017-10-30 NOTE — LETTER
10/30/2017 9:09 AM 
 
Ms. Tamara Ricketts [address] [place] [address] Dear Tamara Ricketts: 
 
Please find your most recent results below. Resulted Orders METABOLIC PANEL, COMPREHENSIVE Result Value Ref Range Glucose 97 65 - 99 mg/dL BUN 13 6 - 24 mg/dL Creatinine 0.79 0.57 - 1.00 mg/dL GFR est non-AA 83 >59 mL/min/1.73 GFR est AA 95 >59 mL/min/1.73  
 BUN/Creatinine ratio 16 9 - 23 Sodium 141 134 - 144 mmol/L Potassium 4.2 3.5 - 5.2 mmol/L Chloride 100 96 - 106 mmol/L  
 CO2 21 18 - 29 mmol/L Calcium 9.7 8.7 - 10.2 mg/dL Protein, total 7.6 6.0 - 8.5 g/dL Albumin 4.8 3.5 - 5.5 g/dL GLOBULIN, TOTAL 2.8 1.5 - 4.5 g/dL A-G Ratio 1.7 1.2 - 2.2 Bilirubin, total 0.5 0.0 - 1.2 mg/dL Alk. phosphatase 91 39 - 117 IU/L  
 AST (SGOT) 13 0 - 40 IU/L  
 ALT (SGPT) 12 0 - 32 IU/L  
CBC W/O DIFF Result Value Ref Range WBC 5.2 3.4 - 10.8 x10E3/uL  
 RBC 4.79 3.77 - 5.28 x10E6/uL HGB 14.2 11.1 - 15.9 g/dL HCT 42.1 34.0 - 46.6 % MCV 88 79 - 97 fL  
 MCH 29.6 26.6 - 33.0 pg  
 MCHC 33.7 31.5 - 35.7 g/dL  
 RDW 13.9 12.3 - 15.4 % PLATELET 435 260 - 569 x10E3/uL LIPID PANEL Result Value Ref Range Cholesterol, total 277 (H) 100 - 199 mg/dL Triglyceride 70 0 - 149 mg/dL HDL Cholesterol 100 >39 mg/dL VLDL, calculated 14 5 - 40 mg/dL LDL, calculated 163 (H) 0 - 99 mg/dL CEA Result Value Ref Range CEA 1.5 0.0 - 4.7 ng/mL RECOMMENDATIONS: 
As we discussed at your visit today (10/30/17) your labs look good. Please call me if you have any questions: [phone] Sincerely, Carlyn English MD

## 2017-12-03 RX ORDER — ESTRADIOL 10 UG/1
TABLET VAGINAL
Qty: 24 TAB | Refills: 1 | Status: SHIPPED | OUTPATIENT
Start: 2017-12-03 | End: 2018-06-02 | Stop reason: SDUPTHER

## 2018-06-03 RX ORDER — ESTRADIOL 10 UG/1
TABLET VAGINAL
Qty: 24 TAB | Refills: 1 | Status: SHIPPED | OUTPATIENT
Start: 2018-06-03

## 2018-11-18 RX ORDER — ESTRADIOL 10 UG/1
TABLET VAGINAL
Qty: 24 TAB | Refills: 1 | OUTPATIENT
Start: 2018-11-18

## 2018-11-20 NOTE — TELEPHONE ENCOUNTER
Per Minor Dykes, patient is no longer a patient here due to insurance. Dr. Rachel Álvarez' name removed as PCP.

## 2023-10-26 ENCOUNTER — TELEPHONE (OUTPATIENT)
Age: 63
End: 2023-10-26

## 2023-10-26 NOTE — TELEPHONE ENCOUNTER
Call to [facility], spoke with Carlos Mccarthy, [phone], patient ID verified X 2. Requested labs for referral, per Carlos Mccarthy he will send, supplied fax number. Update to Provider. ----- Message from KATHY Bernardo NP sent at 10/26/2023 11:52 AM EDT -----  Looks like due to abnormal CBC  Mayela/Ty: Can you please request the lab results?    ----- Message -----  From: Itz Fontenot  Sent: 10/26/2023   9:29 AM EDT  To: KATHY Bernardo NP; #    Patient called stated she was a previous patient and would like to be seen again, Couldn't see anything in Epic Please advise?

## 2025-05-12 PROBLEM — Z85.038 HISTORY OF COLON CANCER: Status: ACTIVE | Noted: 2025-05-12

## 2025-05-16 ENCOUNTER — ANESTHESIA (OUTPATIENT)
Facility: HOSPITAL | Age: 65
End: 2025-05-16
Payer: MEDICARE

## 2025-05-16 ENCOUNTER — ANESTHESIA EVENT (OUTPATIENT)
Facility: HOSPITAL | Age: 65
End: 2025-05-16
Payer: MEDICARE

## 2025-05-16 ENCOUNTER — HOSPITAL ENCOUNTER (OUTPATIENT)
Facility: HOSPITAL | Age: 65
Setting detail: OUTPATIENT SURGERY
Discharge: HOME | End: 2025-05-16
Attending: COLON & RECTAL SURGERY | Admitting: COLON & RECTAL SURGERY
Payer: MEDICARE

## 2025-05-16 VITALS
WEIGHT: 131 LBS | BODY MASS INDEX: 21.83 KG/M2 | HEIGHT: 65 IN | DIASTOLIC BLOOD PRESSURE: 54 MMHG | TEMPERATURE: 98.1 F | RESPIRATION RATE: 14 BRPM | HEART RATE: 60 BPM | SYSTOLIC BLOOD PRESSURE: 105 MMHG | OXYGEN SATURATION: 99 %

## 2025-05-16 PROCEDURE — 3700000001 HC ADD 15 MINUTES (ANESTHESIA): Performed by: COLON & RECTAL SURGERY

## 2025-05-16 PROCEDURE — 6360000002 HC RX W HCPCS

## 2025-05-16 PROCEDURE — 3600007502: Performed by: COLON & RECTAL SURGERY

## 2025-05-16 PROCEDURE — 7100000011 HC PHASE II RECOVERY - ADDTL 15 MIN: Performed by: COLON & RECTAL SURGERY

## 2025-05-16 PROCEDURE — 2709999900 HC NON-CHARGEABLE SUPPLY: Performed by: COLON & RECTAL SURGERY

## 2025-05-16 PROCEDURE — 2580000003 HC RX 258

## 2025-05-16 PROCEDURE — 7100000010 HC PHASE II RECOVERY - FIRST 15 MIN: Performed by: COLON & RECTAL SURGERY

## 2025-05-16 PROCEDURE — 3600007512: Performed by: COLON & RECTAL SURGERY

## 2025-05-16 PROCEDURE — 3700000000 HC ANESTHESIA ATTENDED CARE: Performed by: COLON & RECTAL SURGERY

## 2025-05-16 RX ORDER — SODIUM CHLORIDE 0.9 % (FLUSH) 0.9 %
5-40 SYRINGE (ML) INJECTION PRN
Status: CANCELLED | OUTPATIENT
Start: 2025-05-16

## 2025-05-16 RX ORDER — PHENYLEPHRINE HCL IN 0.9% NACL 0.4MG/10ML
SYRINGE (ML) INTRAVENOUS
Status: DISCONTINUED | OUTPATIENT
Start: 2025-05-16 | End: 2025-05-16 | Stop reason: SDUPTHER

## 2025-05-16 RX ORDER — EZETIMIBE 10 MG/1
10 TABLET ORAL DAILY
COMMUNITY
Start: 2025-03-27

## 2025-05-16 RX ORDER — SODIUM CHLORIDE 9 MG/ML
INJECTION, SOLUTION INTRAVENOUS
Status: DISCONTINUED | OUTPATIENT
Start: 2025-05-16 | End: 2025-05-16 | Stop reason: SDUPTHER

## 2025-05-16 RX ORDER — SODIUM CHLORIDE 0.9 % (FLUSH) 0.9 %
5-40 SYRINGE (ML) INJECTION EVERY 12 HOURS SCHEDULED
Status: CANCELLED | OUTPATIENT
Start: 2025-05-16

## 2025-05-16 RX ORDER — SODIUM CHLORIDE 9 MG/ML
INJECTION, SOLUTION INTRAVENOUS CONTINUOUS
Status: CANCELLED | OUTPATIENT
Start: 2025-05-16

## 2025-05-16 RX ORDER — LIDOCAINE HYDROCHLORIDE 20 MG/ML
INJECTION, SOLUTION EPIDURAL; INFILTRATION; INTRACAUDAL; PERINEURAL
Status: DISCONTINUED | OUTPATIENT
Start: 2025-05-16 | End: 2025-05-16 | Stop reason: SDUPTHER

## 2025-05-16 RX ORDER — SODIUM CHLORIDE 9 MG/ML
INJECTION, SOLUTION INTRAVENOUS PRN
Status: CANCELLED | OUTPATIENT
Start: 2025-05-16

## 2025-05-16 RX ADMIN — PROPOFOL 30 MG: 10 INJECTION, EMULSION INTRAVENOUS at 12:16

## 2025-05-16 RX ADMIN — SODIUM CHLORIDE: 9 INJECTION, SOLUTION INTRAVENOUS at 12:35

## 2025-05-16 RX ADMIN — PROPOFOL 40 MG: 10 INJECTION, EMULSION INTRAVENOUS at 12:27

## 2025-05-16 RX ADMIN — PROPOFOL 40 MG: 10 INJECTION, EMULSION INTRAVENOUS at 12:22

## 2025-05-16 RX ADMIN — LIDOCAINE HYDROCHLORIDE 50 MG: 20 INJECTION, SOLUTION EPIDURAL; INFILTRATION; INTRACAUDAL; PERINEURAL at 12:11

## 2025-05-16 RX ADMIN — SODIUM CHLORIDE: 9 INJECTION, SOLUTION INTRAVENOUS at 12:04

## 2025-05-16 RX ADMIN — PROPOFOL 40 MG: 10 INJECTION, EMULSION INTRAVENOUS at 12:24

## 2025-05-16 RX ADMIN — PROPOFOL 50 MG: 10 INJECTION, EMULSION INTRAVENOUS at 12:13

## 2025-05-16 RX ADMIN — PROPOFOL 10 MG: 10 INJECTION, EMULSION INTRAVENOUS at 12:14

## 2025-05-16 RX ADMIN — PROPOFOL 20 MG: 10 INJECTION, EMULSION INTRAVENOUS at 12:18

## 2025-05-16 RX ADMIN — PROPOFOL 50 MG: 10 INJECTION, EMULSION INTRAVENOUS at 12:11

## 2025-05-16 RX ADMIN — PROPOFOL 40 MG: 10 INJECTION, EMULSION INTRAVENOUS at 12:12

## 2025-05-16 RX ADMIN — Medication 80 MCG: at 12:16

## 2025-05-16 RX ADMIN — PROPOFOL 30 MG: 10 INJECTION, EMULSION INTRAVENOUS at 12:20

## 2025-05-16 RX ADMIN — PROPOFOL 50 MG: 10 INJECTION, EMULSION INTRAVENOUS at 12:31

## 2025-05-16 RX ADMIN — PROPOFOL 30 MG: 10 INJECTION, EMULSION INTRAVENOUS at 12:35

## 2025-05-16 ASSESSMENT — PAIN - FUNCTIONAL ASSESSMENT: PAIN_FUNCTIONAL_ASSESSMENT: NONE - DENIES PAIN

## 2025-05-16 NOTE — ANESTHESIA PRE PROCEDURE
Department of Anesthesiology  Preprocedure Note       Name:  Fam Lau   Age:  64 y.o.  :  1960                                          MRN:  [id number]         Date:  2025      Surgeon: Surgeon(s):  Kuldip Tony MD    Procedure: Procedure(s):  COLONOSCOPY    Medications prior to admission:   Prior to Admission medications    Medication Sig Start Date End Date Taking? Authorizing Provider   ezetimibe (ZETIA) 10 MG tablet Take 1 tablet by mouth daily 3/27/25  Yes ProviderLeatha MD   Cholecalciferol 50 MCG ( UT) TABS Take 1 tablet by mouth daily   Yes ProviderLeatha MD   Inulin-Cholecalciferol 2.5-500 GM-UNIT CHEW Take 2 each by mouth daily   Yes ProviderLeatha MD       Current medications:    No current facility-administered medications for this encounter.       Allergies:    Allergies   Allergen Reactions   • Sulfa Antibiotics Anaphylaxis   • Red Dye #40 (Allura Red)        Problem List:    Patient Active Problem List   Diagnosis Code   • Multiple fractures T07.XXXA   • Vaginal dryness, menopausal N95.1   • Anxiety F41.9   • Osteopenia M85.80   • Psoriasis L40.9   • History of colon cancer Z85.038       Past Medical History:        Diagnosis Date   • History of cervical dysplasia    • History of colon cancer 2012    Moderately differentiated T3N0M0 (stage IIA) adenocarcinoma of the ascending colon.  Treated via resection on 2012.   • History of kidney stones    • History of spinal fracture     Multiple fractrues sustained at age 13.  Treeated with a body cast.   • Leukopenia     Since 2019. Idiopathic.   • Osteopenia        Past Surgical History:        Procedure Laterality Date   • BREAST BIOPSY     •  SECTION     • COLONOSCOPY      Dr. Galvez   • COLONOSCOPY  2015    Normal post-resection anatomy; Dr. Tony.   • COLONOSCOPY  2012    Dr. Tony   • COLONOSCOPY  2012   • ORTHOPEDIC SURGERY      Surgery for

## 2025-05-16 NOTE — PROGRESS NOTES
Initial RN admission and assessment performed and documented in Endoscopy navigator.     Patient evaluated by anesthesia in pre-procedure holding.     All procedural vital signs, airway assessment, and level of consciousness information monitored and recorded by anesthesia staff on the anesthesia record.     Report received from CRNA post procedure.  Patient transported to recovery area by RN.    Endoscopy post procedure time out was performed and specimens were verified with physician.    Endoscope was pre-cleaned at bedside immediately following procedure by Sujit.  Verified patient name and date of birth, scheduled procedure, and informed consent. Reviewed general discharge instructions and  information.  Assessed patient. Awake, alert, and oriented per baseline. Vital signs stable (see vital sign flowsheet). Respiratory status within defined limits, abdomen soft and non tender. Skin with in defined limits.

## 2025-05-16 NOTE — DISCHARGE INSTRUCTIONS
[facility]ELY[facility]ers.  [address]  [place] [address] [phone]      Post-Colonoscopy Instructions    Do not drive, operate dangerous machinery, sign legal documents, or conduct any important business for the rest of the day.    You may begin with a light diet today then advance to your usual diet as tolerated.  Do not drink alcohol for the rest of the day.    If you notice blood in your stool for more than one or two bowel movements following the procedure, if you develop abdominal pain (aside from gas cramps on the day of the procedure), or if you develop a fever with a temperature of 101.0 or higher, call my office.    If you do not have a bowel movement within the next two days, call my office.    If you have any other problems or questions, please call my office.    6.   Findings and Follow-up:  There was no cancer or polyp.  You have mild diverticulosis and you internal hemorrhoidal enlargement was noted.  You should consume a high fiber diet and, for colorectal cancer screening, you should plan to undergo another colonoscopy in 5 years.

## 2025-05-16 NOTE — ANESTHESIA POSTPROCEDURE EVALUATION
Department of Anesthesiology  Postprocedure Note    Patient: Fam Lau  MRN: [id number]  YOB: 1960  Date of evaluation: 5/16/2025    Procedure Summary       Date: 05/16/25 Room / Location: [facility] 01 / [facility] ENDOSCOPY    Anesthesia Start: 1207 Anesthesia Stop: 1238    Procedure: COLONOSCOPY Diagnosis:       Colon cancer screening      (Colon cancer screening [Z12.11])    Surgeons: Kuldip Tony MD Responsible Provider: Ike Beasley MD    Anesthesia Type: MAC ASA Status: 2            Anesthesia Type: MAC    Dixie Phase I: Dixie Score: 10    Dixie Phase II: Dixie Score: 10    Anesthesia Post Evaluation    Patient location during evaluation: bedside  Nausea & Vomiting: no nausea  Cardiovascular status: blood pressure returned to baseline  Respiratory status: acceptable  Hydration status: euvolemic    No notable events documented.

## 2025-05-16 NOTE — OP NOTE
Fam Lau  [id number]  1960    Colonoscopy Operative Report    Procedure Type:   Colonoscopy     Pre-operative Diagnosis:  Need for colorectal cancer screening    Post-operative Diagnosis:  Diverticulosis.  Hemorrhoids.    Surgeon:  Kuldip Tony MD    Assistant:  Endoscopy Technician: Sujit Triplett    Referring Provider: No primary care provider on file.    Sedation and Analgesia:  MAC anesthesia Propofol (430 mg)    Specimens Removed:  None    EBL:  0 mL    Complications: None apparent    Implants:  None    Indication For Procedure:  The patient is an asymptomatic 64-year-old female with a history of colon cancer whose last colonoscopy was performed by Dr. Galvez in 2019.  Another colonoscopy is now indicated for colorectal cancer screening.     Findings:  There were enlarged internal hemorrhoids.  There was mild left-sided diverticulosis.  The ileocolic anastomosis was healthy and widely patient.  There was no evidence of neoplasia.    Procedure Details:  After informed consent was obtained, the patient was taken to the endoscopy suite where standard monitoring devices were attached and intravenous access was established.  Sedation was administered by the anesthetist as needed throughout the procedure.  The patient was placed in the left lateral decubitus position, and inspection of the perineum revealed no significant external lesions.  Digital rectal examination revealed no masses.    The Olympus videocolonoscope was lubricated and inserted transanally into the rectum. It was advanced into the colon and without difficulty to the ileocolic anastomosis.  The quality of the bowel preparation was good, as was the overall visualization.  Careful inspection was performed during withdrawal of the colonoscope.  There were no apparent complications, and the patient appeared to have tolerated the procedure well.  At its conclusion, she was transported to the recovery area in good

## 2025-05-16 NOTE — H&P
History and Physical (outpatient)    Patient: Fam Lau 64 y.o. female     Chief Complaint: Need for colorectal cancer screening    History of Present Illness:  The patient is an asymptomatic 64-year-old female with a history of colon cancer whose last colonoscopy was performed by Dr. Galvez in 2019.        History:  Past Medical History:   Diagnosis Date    History of cervical dysplasia     History of colon cancer     Moderately differentiated T3N0M0 (stage IIA) adenocarcinoma of the ascending colon.  Treated via resection on 2012.    History of kidney stones     History of spinal fracture     Multiple fractrues sustained at age 13.  Treeated with a body cast.    Leukopenia     Since 2019. Idiopathic.    Osteopenia        Past Surgical History:   Procedure Laterality Date    BREAST BIOPSY       SECTION      COLONOSCOPY      Dr. Galvez    COLONOSCOPY  2015    Normal post-resection anatomy; Dr. Tony.    COLONOSCOPY  2012    Dr. Tony    COLONOSCOPY  2012    ORTHOPEDIC SURGERY      Surgery for stenosing tenosynovitis of left wrist after injury related to IV    OTHER SURGICAL HISTORY  2012    Hand-assisted laparoscopic right hemicolectomy, liver biopsy, and placement of On-Q catheters; Dr. Tony.    OTHER SURGICAL HISTORY  2012    Exploration, debridement, and closure of abdominal wound; Dr. Tony.    OTHER SURGICAL HISTORY  2003    Excision of anal skin tag and sebaceous cyst; office procedure performed under local anesthesia; Dr. Tony.    OTHER SURGICAL HISTORY  2002    Incision and drainage of perirectal abscess and excision of external hemorrhoids; Dr. Tony.    WISDOM TOOTH EXTRACTION         No family history on file.  Social History     Socioeconomic History    Marital status:      Spouse name: Not on file    Number of children: Not on file    Years of education: Not on file

## (undated) DEVICE — SUPPLEMENT DIGESTIVE H2O SOL GI-EASE